# Patient Record
Sex: FEMALE | Race: WHITE
[De-identification: names, ages, dates, MRNs, and addresses within clinical notes are randomized per-mention and may not be internally consistent; named-entity substitution may affect disease eponyms.]

---

## 2017-02-08 ENCOUNTER — HOSPITAL ENCOUNTER (OUTPATIENT)
Dept: HOSPITAL 80 - FIMAGING | Age: 52
End: 2017-02-08
Attending: GENERAL ACUTE CARE HOSPITAL
Payer: COMMERCIAL

## 2017-02-08 DIAGNOSIS — Z12.31: Primary | ICD-10-CM

## 2017-02-08 PROCEDURE — G0202 SCR MAMMO BI INCL CAD: HCPCS

## 2017-02-08 NOTE — MA
Screening Digital Mammogram With iCAD Analysis



Clinical Indications: Routine screening. Patient has had a benign right breast biopsy.



Technique: Standard cephalocaudal projections were obtained. Digital breast tomosynthesis was perform
ed in the MLO projection with reconstruction at 1.0-mm slice thickness and composite MLO views recons
tructed. This examination was processed by the iCAD computer-aided detection system. 



Comparison: February 2016, February 2015, February 2014, August 2013, July 2012, August 2011, August 2010, July 2009.



Breast density: Type B; Scattered fibroglandular densities.



Findings: CAD was reviewed. No masses, suspicious calcifications or secondary signs of malignancy are
 seen. There has been no significant change in the appearance of either breast. Again noted is a smal
l nodule immediately below the skin in the medial right breast. On prior ultrasound and mammographic 
evaluation this was felt to represent a sebaceous cyst and is unchanged from the 2016 study.



Impression: Benign mammography. BI-RADS 2. 



Recommendation: Routine mammographic screening in one year as long as physical examination is negativ
[facility] will send a result letter to the patient.

Negative mammography should not preclude additional workup of a clinically suspicious finding.

The patient's information is entered into a reminder system with a target due date for her next mammo
gram.

## 2018-02-12 ENCOUNTER — HOSPITAL ENCOUNTER (OUTPATIENT)
Dept: HOSPITAL 80 - FIMAGING | Age: 53
End: 2018-02-12
Attending: OBSTETRICS & GYNECOLOGY
Payer: COMMERCIAL

## 2018-02-12 DIAGNOSIS — Z12.31: Primary | ICD-10-CM

## 2019-02-21 ENCOUNTER — HOSPITAL ENCOUNTER (OUTPATIENT)
Dept: HOSPITAL 80 - FIMAGING | Age: 54
End: 2019-02-21
Payer: COMMERCIAL

## 2019-02-21 DIAGNOSIS — Z12.31: Primary | ICD-10-CM
